# Patient Record
(demographics unavailable — no encounter records)

---

## 2025-03-18 NOTE — HISTORY OF PRESENT ILLNESS
[de-identified] : PATIENT NAME: PEYTON WASHINGTON (9/29/1970/51 Y) 03/14/2022  Surgery - The patient was taken to the OR today. 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 PROCEDURES PERFORMED: 1. Right knee arthroscopic medial meniscectomy (CPT Code: 29770.59). 2. Right knee arthroscopic synovectomy (CPT Code: 77780.59). 3. Right knee arthroscopic chondroplasty of the patellofemoral medial and lateral compartments (CPT Code: 09656.59).  rle tri comp ttp rom 0-120 comp soft and nt pos pat grind med comp ttp  right knee arthritis  plan went over findings explained previous surgery and tx otpions explained arthritic changes right knee injection  Large Joint Injection was performed because of pain/rom. Anesthesia: ethyl chloride sprayed topically. 4cc durolane Medication was injected in the right knee. Patient has tried OTC's including aspirin, Ibuprofen, Aleve etc or prescription NSAIDS, and/or exercises at home and/ or physical therapy without satisfactory response. After verbal consent using sterile preparation and technique. The risks, benefits, and alternatives to cortisone injection were explained in full to the patient. Risks outlined include but are not limited to infection, sepsis, bleeding, scarring, skin discoloration, temporary increase in pain, syncopal episode, failure to resolve symptoms, allergic reaction, symptom recurrence, and elevation of blood sugar in diabetics. Patient understood the risks. All questions were answered. After discussion of options, patient requested an injection. Oral informed consent was obtained and sterile prep was done of the injection site. Sterile technique was utilized for the procedure including the preparation of the solutions used for the injection. Patient tolerated the procedure well. Advised to ice the injection site this evening. Prep with alcohol locally to site. Sterile technique used. Diagnostic ultrasound was performed of the knee to confirm.

## 2025-04-08 NOTE — HISTORY OF PRESENT ILLNESS
[Patient reported mammogram was normal] : Patient reported mammogram was normal [Patient reported colonoscopy was normal] : Patient reported colonoscopy was normal [Y] : Positive pregnancy history [TextBox_4] : GYNHX No history of fibroids, cysts, or STDs [Mammogramdate] : 6-2024 [PapSmeardate] : 4-2024 [ColonoscopyDate] : 2022 [PGxTotal] : 1 [Page HospitalxFulerm] : 1 [FreeTextEntry1] :

## 2025-04-08 NOTE — DISCUSSION/SUMMARY
[FreeTextEntry1] : 54-year-old para 1 annual GYN, cold sores, hemorrhoids Pap HPV Mammogram, breast sonogram Weight loss discussed Valtrex 2 g by Proctosone Magnesium supplement Smoking cessation Sciatica exercise

## 2025-04-08 NOTE — PHYSICAL EXAM
[Chaperone Declined] : Chaperone offered however refused by patient, [Appropriately responsive] : appropriately responsive [Alert] : alert [No Acute Distress] : no acute distress [No Lymphadenopathy] : no lymphadenopathy [Soft] : soft [Non-tender] : non-tender [Non-distended] : non-distended [No HSM] : No HSM [No Lesions] : no lesions [No Mass] : no mass [Oriented x3] : oriented x3 [Examination Of The Breasts] : a normal appearance [No Discharge] : no discharge [No Masses] : no breast masses were palpable [Labia Majora] : normal [Labia Minora] : normal [Normal] : normal [Uterine Adnexae] : normal